# Patient Record
(demographics unavailable — no encounter records)

---

## 2025-01-29 NOTE — HISTORY OF PRESENT ILLNESS
[FreeTextEntry1] : RAH [de-identified] : 69 y/o female with pmhx of parkinsons disease, htn, hld, presents for new patient annual wellness visit. Patient is following with neurology Dr. Martin's office for Parkinson's disease. Overall is doing well

## 2025-01-29 NOTE — ASSESSMENT
[FreeTextEntry1] : CPE: -will order routine lab work -medical history including surgical history, family history, and current medications reviewed and documented as above -Age appropriate screenings including but not limited to cancer screening, alcohol misuse (audit-c) and vision screening as documented above -patient counseled on healthy diet and lifestyle changes including increasing physical activity and eating a diet low in processed foods and high in fruits and vegetables -counseled patient on age appropriate vaccinations and immunization record updated as above- declined vaccines at this time  HTN bp stable, continue losartan 100mg daily  Parkinsons disease continue current medication regimen as per neuro - will request records

## 2025-01-29 NOTE — HEALTH RISK ASSESSMENT
[2 - 4 times a month (2 pts)] : 2-4 times a month (2 points) [1 or 2 (0 pts)] : 1 or 2 (0 points) [Never (0 pts)] : Never (0 points) [Yes] : In the past 12 months have you used drugs other than those required for medical reasons? Yes [Assistive Device] : Patient uses an assistive device [Two or more falls in past year] : Patient reported two or more falls in the past year [0] : 2) Feeling down, depressed, or hopeless: Not at all (0) [PHQ-2 Negative - No further assessment needed] : PHQ-2 Negative - No further assessment needed [Former] : Former [20 or more] : 20 or more [> 15 Years] : > 15 Years [Patient declined mammogram] : Patient declined mammogram [Patient reported colonoscopy was normal] : Patient reported colonoscopy was normal [With Significant Other] : lives with significant other [On disability] : on disability [Retired] : retired [Significant Other] : lives with significant other [Fully functional (bathing, dressing, toileting, transferring, walking, feeding)] : Fully functional (bathing, dressing, toileting, transferring, walking, feeding) [Fully functional (using the telephone, shopping, preparing meals, housekeeping, doing laundry, using] : Fully functional and needs no help or supervision to perform IADLs (using the telephone, shopping, preparing meals, housekeeping, doing laundry, using transportation, managing medications and managing finances) [With Patient/Caregiver] : , with patient/caregiver [Designated Healthcare Proxy] : Designated healthcare proxy [de-identified] : social use [Audit-CScore] : 2 [de-identified] : marijuana [de-identified] : has been trying to healthy [de-identified] : sometimes uses a cane [[id number]] : 0 [Reports changes in hearing] : Reports no changes in hearing [Reports changes in vision] : Reports no changes in vision [MammogramComments] : patient will think about [BoneDensityComments] : due will order [ColonoscopyComments] : had colonoscopy about 3 years ago [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

## 2025-05-01 NOTE — ASSESSMENT
[FreeTextEntry1] : HTN bp borderline low - recommend increasing oral hydartion continue losartan 100mg daily   Parkinsons disease continue current medication regimen as per neuro - will request records  HLD now on atorvastatin 10mg daily will repeat lipid panel today  Prediabetes - recommend working on dietary changes  Insomnia - recommend discussing treatment options with neurologist

## 2025-05-01 NOTE — HISTORY OF PRESENT ILLNESS
[FreeTextEntry1] : follow up of chronic medical conditions [de-identified] : 68 y/o female with pmhx of parkinsons disease, htn, hld, presents for follow up. Patient is following with neurology Dr. Martin's office for Parkinson's disease. Overall is doing well. She does admit to some issues sleeping at night, only getting about 4 hours